# Patient Record
Sex: MALE | Race: WHITE | Employment: STUDENT | URBAN - NONMETROPOLITAN AREA
[De-identification: names, ages, dates, MRNs, and addresses within clinical notes are randomized per-mention and may not be internally consistent; named-entity substitution may affect disease eponyms.]

---
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
## 2025-01-30 ENCOUNTER — HOSPITAL ENCOUNTER (EMERGENCY)
Age: 20
Discharge: HOME | End: 2025-01-30
Attending: FAMILY MEDICINE
Payer: MEDICAID

## 2025-01-30 VITALS
OXYGEN SATURATION: 98 % | SYSTOLIC BLOOD PRESSURE: 123 MMHG | TEMPERATURE: 97.6 F | RESPIRATION RATE: 18 BRPM | DIASTOLIC BLOOD PRESSURE: 61 MMHG | BODY MASS INDEX: 32.2 KG/M2 | HEIGHT: 71 IN | HEART RATE: 93 BPM | WEIGHT: 230 LBS

## 2025-01-30 DIAGNOSIS — J10.1 INFLUENZA A: Primary | ICD-10-CM

## 2025-01-30 LAB
INFLUENZA A BY PCR: DETECTED
INFLUENZA B BY PCR: NOT DETECTED
SARS-COV-2 RNA, RT PCR: NOT DETECTED

## 2025-01-30 PROCEDURE — 99283 EMERGENCY DEPT VISIT LOW MDM: CPT

## 2025-01-30 PROCEDURE — 87636 SARSCOV2 & INF A&B AMP PRB: CPT

## 2025-01-30 RX ORDER — ONDANSETRON 4 MG/1
4 TABLET, FILM COATED ORAL EVERY 8 HOURS PRN
Qty: 20 TABLET | Refills: 0 | Status: SHIPPED | OUTPATIENT
Start: 2025-01-30 | End: 2025-02-19

## 2025-01-30 RX ORDER — LEVOTHYROXINE SODIUM 50 UG/1
50 TABLET ORAL DAILY
COMMUNITY

## 2025-01-30 RX ORDER — DEXTROMETHORPHAN HYDROBROMIDE AND PROMETHAZINE HYDROCHLORIDE 15; 6.25 MG/5ML; MG/5ML
5 SYRUP ORAL 4 TIMES DAILY PRN
Qty: 118 ML | Refills: 0 | Status: SHIPPED | OUTPATIENT
Start: 2025-01-30 | End: 2025-02-06

## 2025-01-30 RX ORDER — TRAZODONE HYDROCHLORIDE 100 MG/1
100 TABLET ORAL NIGHTLY
COMMUNITY

## 2025-01-30 ASSESSMENT — PAIN - FUNCTIONAL ASSESSMENT: PAIN_FUNCTIONAL_ASSESSMENT: 0-10

## 2025-01-30 ASSESSMENT — PAIN SCALES - GENERAL: PAINLEVEL_OUTOF10: 7

## 2025-01-30 NOTE — ED NOTES
Pt presents with c/o cough and fever x 3 days, mother states that pt is on probation and had to come here to get proof of him being sick, pt alert and oriented, able to ambulate to room independently without difficulty.

## 2025-01-30 NOTE — ED PROVIDER NOTES
radiologist.    No orders to display       LABS:   Labs Reviewed   COVID-19 & INFLUENZA COMBO - Abnormal; Notable for the following components:       Result Value    Influenza A by PCR DETECTED (*)     All other components within normal limits   COVID-19 & INFLUENZA COMBO       EMERGENCY DEPARTMENT COURSE & MDM:   Vitals:    Vitals:    01/30/25 0829   BP: 123/61   Pulse: 93   Resp: 18   Temp: 97.6 °F (36.4 °C)   TempSrc: Temporal   SpO2: 98%   Weight: 104.3 kg (230 lb)   Height: 1.803 m (5' 11\")       MDM    9:13 AM EST: The patient was seen and evaluated.  ED Course as of 01/30/25 0915   Thu Jan 30, 2025   0912 We obtained a COVID and influenza screen.  Patient has not tested positive for influenza.  His vital signs are stable.  He is nontoxic-appearing.  I feel he safe for discharge and outpatient management.  He will be treated with symptomatic relief medications [SM]      ED Course User Index  [SM] Ravin Cronin MD         1)  Number and Complexity of Problems  Problem List This Visit:   Chief Complaint   Patient presents with    Cough     X 3 days    Fever     Diagnoses Considered but Do Not Suspect:  Please see Differential Diagnosis  Pertinent Comorbid Conditions:   Please see PMH and ED course     2)  Data Reviewed  EKG was reviewed in detail. See EKG section above.  Decision Rules/Scores utilized:  See ED course.  Tests considered but not ordered and why:  See ED course.   External Documents Reviewed:  Prior medical records  Imaging that is independently reviewed with the radiologist report, not interpreted by me are listed in ED course.   Imaging that is independently reviewed and interpreted by me are listed in ED course.      See more data below for the lab and radiology tests and orders.     Medical Decision Making  Amount and/or Complexity of Data Reviewed  Labs: ordered.  Radiology: ordered.  ECG/medicine tests: ordered.     Risk  Prescription drug management.     3)  Treatment and

## 2025-01-30 NOTE — DISCHARGE INSTRUCTIONS
Ben Smith,    It has been my absolute pleasure to serve you while in the emergency department at [facility] today.  Please do remember to take all medications as prescribed.  Please take cough syrup which also works as a nausea medication called Phenergan.  Will also prescribe tablet for your nausea that you can put on your tongue.    Please make sure you follow-up with your PCP within 7 days.  Please follow-up with any and all specialists as we discussed.  Please return to the ER in case of any worsening of symptoms.  I wish you a speedy recovery!    Sincerely,    Dr. Ravin Cronin MD.

## 2025-01-30 NOTE — DISCHARGE INSTR - COC
Continuity of Care Form    Patient Name: Ben Smith   :  2005  MRN:  [id number]    Admit date:  2025  Discharge date:  ***    Code Status Order: No Order   Advance Directives:   Advance Care Flowsheet Documentation             Admitting Physician:  No admitting provider for patient encounter.  PCP: Avery Holman DO    Discharging Nurse: ***  Discharging Hospital Unit/Room#: E2/E2  Discharging Unit Phone Number: ***    Emergency Contact:   Extended Emergency Contact Information  Primary Emergency Contact: Braydon Smith  Mobile Phone: [phone]  Relation: Parent  Preferred language: English   needed? No    Past Surgical History:  History reviewed. No pertinent surgical history.    Immunization History:     There is no immunization history on file for this patient.    Active Problems:  There is no problem list on file for this patient.      Isolation/Infection:   Isolation            No Isolation          Patient Infection Status       None to display                     Nurse Assessment:  Last Vital Signs: /61   Pulse 93   Temp 97.6 °F (36.4 °C) (Temporal)   Resp 18   Ht 1.803 m (5' 11\")   Wt 104.3 kg (230 lb)   SpO2 98%   BMI 32.08 kg/m²     Last documented pain score (0-10 scale): Pain Level: 7  Last Weight:   Wt Readings from Last 1 Encounters:   25 104.3 kg (230 lb) (98%, Z= 2.08)*     * Growth percentiles are based on [facility] (Boys, 2-20 Years) data.     Mental Status:  {IP PT MENTAL STATUS:}    IV Access:  { YOLANDE IV ACCESS:246832144}    Nursing Mobility/ADLs:  Walking   {CHP DME ADLs:185921568}  Transfer  {CHP DME ADLs:225832541}  Bathing  {CHP DME ADLs:118554538}  Dressing  {CHP DME ADLs:831734266}  Toileting  {CHP DME ADLs:138240156}  Feeding  {CHP DME ADLs:396812210}  Med Admin  {CHP DME ADLs:280921542}  Med Delivery   { YOLANDE MED Delivery:745812981}    Wound Care Documentation and Therapy:        Elimination:  Continence:   Bowel: {YES /

## 2025-05-04 ENCOUNTER — HOSPITAL ENCOUNTER (INPATIENT)
Age: 20
LOS: 4 days | Discharge: HOME | DRG: 751 | End: 2025-05-08
Attending: EMERGENCY MEDICINE | Admitting: PSYCHIATRY & NEUROLOGY
Payer: COMMERCIAL

## 2025-05-04 DIAGNOSIS — F32.A DEPRESSION, UNSPECIFIED DEPRESSION TYPE: Primary | ICD-10-CM

## 2025-05-04 DIAGNOSIS — R45.851 SUICIDAL IDEATION: ICD-10-CM

## 2025-05-04 PROBLEM — F31.30 BIPOLAR AFFECTIVE DISORDER, CURRENT EPISODE DEPRESSED, CURRENT EPISODE SEVERITY UNSPECIFIED (HCC): Status: ACTIVE | Noted: 2025-05-04

## 2025-05-04 LAB
ALBUMIN SERPL BCG-MCNC: 4.6 G/DL (ref 3.4–4.9)
ALP SERPL-CCNC: 60 U/L (ref 40–129)
ALT SERPL W/O P-5'-P-CCNC: 14 U/L (ref 10–50)
AMPHETAMINES UR QL SCN: NEGATIVE
ANION GAP SERPL CALC-SCNC: 13 MEQ/L (ref 8–16)
APAP SERPL-MCNC: < 5 UG/ML (ref 10–30)
AST SERPL-CCNC: 21 U/L (ref 10–50)
BARBITURATES UR QL SCN: NEGATIVE
BASOPHILS ABSOLUTE: 0.1 THOU/MM3 (ref 0–0.1)
BASOPHILS NFR BLD AUTO: 0.8 %
BENZODIAZ UR QL SCN: NEGATIVE
BILIRUB CONJ SERPL-MCNC: < 0.1 MG/DL (ref 0–0.2)
BILIRUB SERPL-MCNC: 0.3 MG/DL (ref 0.3–1.2)
BILIRUB UR QL STRIP.AUTO: NEGATIVE
BUN SERPL-MCNC: 7 MG/DL (ref 8–23)
BZE UR QL SCN: NEGATIVE
CALCIUM SERPL-MCNC: 9.7 MG/DL (ref 8.6–10)
CANNABINOIDS UR QL SCN: POSITIVE
CHARACTER UR: CLEAR
CHLORIDE SERPL-SCNC: 106 MEQ/L (ref 98–111)
CO2 SERPL-SCNC: 25 MEQ/L (ref 22–29)
COLOR, UA: YELLOW
CREAT SERPL-MCNC: 0.9 MG/DL (ref 0.7–1.2)
DEPRECATED RDW RBC AUTO: 39.5 FL (ref 35–45)
EOSINOPHIL NFR BLD AUTO: 1.7 %
EOSINOPHILS ABSOLUTE: 0.1 THOU/MM3 (ref 0–0.4)
ERYTHROCYTE [DISTWIDTH] IN BLOOD BY AUTOMATED COUNT: 12.9 % (ref 11.5–14.5)
ETHANOL SERPL-MCNC: 0.03 % (ref 0–0.08)
FENTANYL: NEGATIVE
GFR SERPL CREATININE-BSD FRML MDRD: > 90 ML/MIN/1.73M2
GLUCOSE SERPL-MCNC: 95 MG/DL (ref 74–109)
GLUCOSE UR QL STRIP.AUTO: NEGATIVE MG/DL
HCT VFR BLD AUTO: 42.3 % (ref 42–52)
HGB BLD-MCNC: 14.6 GM/DL (ref 14–18)
HGB UR QL STRIP.AUTO: NEGATIVE
IMM GRANULOCYTES # BLD AUTO: 0.01 THOU/MM3 (ref 0–0.07)
IMM GRANULOCYTES NFR BLD AUTO: 0.2 %
KETONES UR QL STRIP.AUTO: ABNORMAL
LIPASE SERPL-CCNC: 26 U/L (ref 13–60)
LYMPHOCYTES ABSOLUTE: 1.8 THOU/MM3 (ref 1–4.8)
LYMPHOCYTES NFR BLD AUTO: 28 %
MAGNESIUM SERPL-MCNC: 1.8 MG/DL (ref 1.6–2.6)
MCH RBC QN AUTO: 29.3 PG (ref 26–33)
MCHC RBC AUTO-ENTMCNC: 34.5 GM/DL (ref 32.2–35.5)
MCV RBC AUTO: 84.9 FL (ref 80–94)
MONOCYTES ABSOLUTE: 0.6 THOU/MM3 (ref 0.4–1.3)
MONOCYTES NFR BLD AUTO: 9.7 %
NEUTROPHILS ABSOLUTE: 3.8 THOU/MM3 (ref 1.8–7.7)
NEUTROPHILS NFR BLD AUTO: 59.6 %
NITRITE UR QL STRIP: NEGATIVE
NRBC BLD AUTO-RTO: 0 /100 WBC
OPIATES UR QL SCN: NEGATIVE
OSMOLALITY SERPL CALC.SUM OF ELEC: 284.6 MOSMOL/KG (ref 275–300)
OXYCODONE: NEGATIVE
PCP UR QL SCN: NEGATIVE
PH UR STRIP.AUTO: 7.5 [PH] (ref 5–9)
PLATELET # BLD AUTO: 195 THOU/MM3 (ref 130–400)
PMV BLD AUTO: 11.8 FL (ref 9.4–12.4)
POTASSIUM SERPL-SCNC: 3.6 MEQ/L (ref 3.5–5.2)
PROT SERPL-MCNC: 7 G/DL (ref 6.4–8.3)
PROT UR STRIP.AUTO-MCNC: NEGATIVE MG/DL
RBC # BLD AUTO: 4.98 MILL/MM3 (ref 4.7–6.1)
SALICYLATES SERPL-MCNC: < 0.5 MG/DL (ref 2–10)
SODIUM SERPL-SCNC: 144 MEQ/L (ref 135–145)
SP GR UR REFRACT.AUTO: 1.02 (ref 1–1.03)
T4 FREE SERPL-MCNC: 1.1 NG/DL (ref 0.92–1.68)
TSH SERPL DL<=0.05 MIU/L-ACNC: 1.8 UIU/ML (ref 0.27–4.2)
UROBILINOGEN, URINE: 1 EU/DL (ref 0–1)
WBC # BLD AUTO: 6.3 THOU/MM3 (ref 4.8–10.8)
WBC #/AREA URNS HPF: NEGATIVE /[HPF]

## 2025-05-04 PROCEDURE — 36415 COLL VENOUS BLD VENIPUNCTURE: CPT

## 2025-05-04 PROCEDURE — 80179 DRUG ASSAY SALICYLATE: CPT

## 2025-05-04 PROCEDURE — 82077 ASSAY SPEC XCP UR&BREATH IA: CPT

## 2025-05-04 PROCEDURE — 83690 ASSAY OF LIPASE: CPT

## 2025-05-04 PROCEDURE — 81003 URINALYSIS AUTO W/O SCOPE: CPT

## 2025-05-04 PROCEDURE — 99285 EMERGENCY DEPT VISIT HI MDM: CPT

## 2025-05-04 PROCEDURE — 84443 ASSAY THYROID STIM HORMONE: CPT

## 2025-05-04 PROCEDURE — 85025 COMPLETE CBC W/AUTO DIFF WBC: CPT

## 2025-05-04 PROCEDURE — 80053 COMPREHEN METABOLIC PANEL: CPT

## 2025-05-04 PROCEDURE — 83735 ASSAY OF MAGNESIUM: CPT

## 2025-05-04 PROCEDURE — 82248 BILIRUBIN DIRECT: CPT

## 2025-05-04 PROCEDURE — 1240000000 HC EMOTIONAL WELLNESS R&B

## 2025-05-04 PROCEDURE — 80307 DRUG TEST PRSMV CHEM ANLYZR: CPT

## 2025-05-04 PROCEDURE — 80143 DRUG ASSAY ACETAMINOPHEN: CPT

## 2025-05-04 PROCEDURE — 84439 ASSAY OF FREE THYROXINE: CPT

## 2025-05-04 RX ORDER — POLYETHYLENE GLYCOL 3350 17 G/17G
17 POWDER, FOR SOLUTION ORAL DAILY PRN
Status: DISCONTINUED | OUTPATIENT
Start: 2025-05-04 | End: 2025-05-08 | Stop reason: HOSPADM

## 2025-05-04 RX ORDER — IBUPROFEN 400 MG/1
400 TABLET, FILM COATED ORAL EVERY 6 HOURS PRN
Status: DISCONTINUED | OUTPATIENT
Start: 2025-05-04 | End: 2025-05-08 | Stop reason: HOSPADM

## 2025-05-04 RX ORDER — ACETAMINOPHEN 325 MG/1
650 TABLET ORAL EVERY 4 HOURS PRN
Status: DISCONTINUED | OUTPATIENT
Start: 2025-05-04 | End: 2025-05-08 | Stop reason: HOSPADM

## 2025-05-04 RX ORDER — TRAZODONE HYDROCHLORIDE 50 MG/1
50 TABLET ORAL NIGHTLY PRN
Status: DISCONTINUED | OUTPATIENT
Start: 2025-05-04 | End: 2025-05-05

## 2025-05-04 RX ORDER — HYDROXYZINE HYDROCHLORIDE 25 MG/1
50 TABLET, FILM COATED ORAL 3 TIMES DAILY PRN
Status: DISCONTINUED | OUTPATIENT
Start: 2025-05-04 | End: 2025-05-08 | Stop reason: HOSPADM

## 2025-05-04 ASSESSMENT — PATIENT HEALTH QUESTIONNAIRE - PHQ9
SUM OF ALL RESPONSES TO PHQ QUESTIONS 1-9: 2
2. FEELING DOWN, DEPRESSED OR HOPELESS: SEVERAL DAYS
1. LITTLE INTEREST OR PLEASURE IN DOING THINGS: SEVERAL DAYS
SUM OF ALL RESPONSES TO PHQ QUESTIONS 1-9: 2

## 2025-05-04 ASSESSMENT — SLEEP AND FATIGUE QUESTIONNAIRES
DO YOU HAVE DIFFICULTY SLEEPING: YES
AVERAGE NUMBER OF SLEEP HOURS: 3
SLEEP PATTERN: DIFFICULTY FALLING ASLEEP
DO YOU USE A SLEEP AID: NO

## 2025-05-04 ASSESSMENT — LIFESTYLE VARIABLES
HOW OFTEN DO YOU HAVE A DRINK CONTAINING ALCOHOL: NEVER
HOW MANY STANDARD DRINKS CONTAINING ALCOHOL DO YOU HAVE ON A TYPICAL DAY: PATIENT DOES NOT DRINK
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
## 2025-05-05 PROBLEM — F70 MILD INTELLECTUAL DISABILITIES: Chronic | Status: ACTIVE | Noted: 2025-05-05

## 2025-05-05 PROBLEM — F63.81 INTERMITTENT EXPLOSIVE DISORDER: Chronic | Status: ACTIVE | Noted: 2025-05-05

## 2025-05-05 PROBLEM — F33.1 MDD (MAJOR DEPRESSIVE DISORDER), RECURRENT EPISODE, MODERATE (HCC): Chronic | Status: ACTIVE | Noted: 2025-05-04

## 2025-05-05 LAB
BASOPHILS ABSOLUTE: 0 THOU/MM3 (ref 0–0.1)
BASOPHILS NFR BLD AUTO: 0.6 %
DEPRECATED RDW RBC AUTO: 41.2 FL (ref 35–45)
EOSINOPHIL NFR BLD AUTO: 2.5 %
EOSINOPHILS ABSOLUTE: 0.2 THOU/MM3 (ref 0–0.4)
ERYTHROCYTE [DISTWIDTH] IN BLOOD BY AUTOMATED COUNT: 13.1 % (ref 11.5–14.5)
HCT VFR BLD AUTO: 43.4 % (ref 42–52)
HGB BLD-MCNC: 14.5 GM/DL (ref 14–18)
IMM GRANULOCYTES # BLD AUTO: 0.02 THOU/MM3 (ref 0–0.07)
IMM GRANULOCYTES NFR BLD AUTO: 0.3 %
LYMPHOCYTES ABSOLUTE: 2 THOU/MM3 (ref 1–4.8)
LYMPHOCYTES NFR BLD AUTO: 31.3 %
MCH RBC QN AUTO: 29.1 PG (ref 26–33)
MCHC RBC AUTO-ENTMCNC: 33.4 GM/DL (ref 32.2–35.5)
MCV RBC AUTO: 87 FL (ref 80–94)
MONOCYTES ABSOLUTE: 0.6 THOU/MM3 (ref 0.4–1.3)
MONOCYTES NFR BLD AUTO: 9.5 %
NEUTROPHILS ABSOLUTE: 3.6 THOU/MM3 (ref 1.8–7.7)
NEUTROPHILS NFR BLD AUTO: 55.8 %
NRBC BLD AUTO-RTO: 0 /100 WBC
PLATELET # BLD AUTO: 183 THOU/MM3 (ref 130–400)
PMV BLD AUTO: 11.7 FL (ref 9.4–12.4)
RBC # BLD AUTO: 4.99 MILL/MM3 (ref 4.7–6.1)
WBC # BLD AUTO: 6.5 THOU/MM3 (ref 4.8–10.8)

## 2025-05-05 PROCEDURE — 1240000000 HC EMOTIONAL WELLNESS R&B

## 2025-05-05 PROCEDURE — 6370000000 HC RX 637 (ALT 250 FOR IP): Performed by: PSYCHIATRY & NEUROLOGY

## 2025-05-05 PROCEDURE — 85025 COMPLETE CBC W/AUTO DIFF WBC: CPT

## 2025-05-05 PROCEDURE — 36415 COLL VENOUS BLD VENIPUNCTURE: CPT

## 2025-05-05 RX ORDER — DIVALPROEX SODIUM 500 MG/1
1000 TABLET, FILM COATED, EXTENDED RELEASE ORAL DAILY
Status: DISCONTINUED | OUTPATIENT
Start: 2025-05-05 | End: 2025-05-08 | Stop reason: HOSPADM

## 2025-05-05 RX ORDER — TRAZODONE HYDROCHLORIDE 50 MG/1
50 TABLET ORAL NIGHTLY
Status: DISCONTINUED | OUTPATIENT
Start: 2025-05-05 | End: 2025-05-08

## 2025-05-05 RX ORDER — ARIPIPRAZOLE 10 MG/1
10 TABLET ORAL DAILY
Status: DISCONTINUED | OUTPATIENT
Start: 2025-05-05 | End: 2025-05-05

## 2025-05-05 RX ADMIN — DIVALPROEX SODIUM 1000 MG: 500 TABLET, EXTENDED RELEASE ORAL at 10:35

## 2025-05-05 RX ADMIN — TRAZODONE HYDROCHLORIDE 50 MG: 50 TABLET ORAL at 21:48

## 2025-05-05 RX ADMIN — SERTRALINE 50 MG: 50 TABLET, FILM COATED ORAL at 10:35

## 2025-05-05 ASSESSMENT — SLEEP AND FATIGUE QUESTIONNAIRES
SLEEP PATTERN: DIFFICULTY FALLING ASLEEP;DIFFICULTY ARISING;DISTURBED/INTERRUPTED SLEEP;RESTLESSNESS;NIGHTMARES/TERRORS
AVERAGE NUMBER OF SLEEP HOURS: 3
DO YOU USE A SLEEP AID: NO

## 2025-05-05 ASSESSMENT — PATIENT HEALTH QUESTIONNAIRE - PHQ9
2. FEELING DOWN, DEPRESSED OR HOPELESS: NEARLY EVERY DAY
8. MOVING OR SPEAKING SO SLOWLY THAT OTHER PEOPLE COULD HAVE NOTICED. OR THE OPPOSITE, BEING SO FIGETY OR RESTLESS THAT YOU HAVE BEEN MOVING AROUND A LOT MORE THAN USUAL: NEARLY EVERY DAY
SUM OF ALL RESPONSES TO PHQ QUESTIONS 1-9: 24
SUM OF ALL RESPONSES TO PHQ QUESTIONS 1-9: 27
SUM OF ALL RESPONSES TO PHQ QUESTIONS 1-9: 27
1. LITTLE INTEREST OR PLEASURE IN DOING THINGS: NEARLY EVERY DAY
3. TROUBLE FALLING OR STAYING ASLEEP: NEARLY EVERY DAY
10. IF YOU CHECKED OFF ANY PROBLEMS, HOW DIFFICULT HAVE THESE PROBLEMS MADE IT FOR YOU TO DO YOUR WORK, TAKE CARE OF THINGS AT HOME, OR GET ALONG WITH OTHER PEOPLE: EXTREMELY DIFFICULT
SUM OF ALL RESPONSES TO PHQ QUESTIONS 1-9: 27
5. POOR APPETITE OR OVEREATING: NEARLY EVERY DAY
6. FEELING BAD ABOUT YOURSELF - OR THAT YOU ARE A FAILURE OR HAVE LET YOURSELF OR YOUR FAMILY DOWN: NEARLY EVERY DAY
4. FEELING TIRED OR HAVING LITTLE ENERGY: NEARLY EVERY DAY
7. TROUBLE CONCENTRATING ON THINGS, SUCH AS READING THE NEWSPAPER OR WATCHING TELEVISION: NEARLY EVERY DAY
9. THOUGHTS THAT YOU WOULD BE BETTER OFF DEAD, OR OF HURTING YOURSELF: NEARLY EVERY DAY

## 2025-05-05 ASSESSMENT — LIFESTYLE VARIABLES: HOW OFTEN DO YOU HAVE A DRINK CONTAINING ALCOHOL: NEVER

## 2025-05-05 NOTE — ED NOTES
ED to inpatient nurses report      Chief Complaint:  Chief Complaint   Patient presents with    Psychiatric Evaluation     Present to ED from: home    MOA:     LOC: alert and orientated to name, place, date  Mobility: Independent  Oxygen Baseline: none    Current needs required: none     Code Status:   No Order    What abnormal results were found and what did you give/do to treat them? See labs, imaging.   Any procedures or intervention occur? See MAR.     Mental Status:  Level of Consciousness: Alert (0)    Psych Assessment:        Vitals:  Patient Vitals for the past 24 hrs:   BP Temp Temp src Pulse Resp SpO2 Height Weight   05/04/25 1949 (!) 146/84 98 °F (36.7 °C) Oral (!) 108 18 96 % 1.829 m (6') 96.6 kg (213 lb)        LDAs:      Ambulatory Status:  Presents to emergency department  because of falls (Syncope, seizure, or loss of consciousness): No, Age > 70: No, Altered Mental Status, Intoxication with alcohol or substance confusion (Disorientation, impaired judgment, poor safety awaremess, or inability to follow instructions): No, Impaired Mobility: Ambulates or transfers with assistive devices or assistance; Unable to ambulate or transer.: No, Nursing Judgement: No    Diagnosis:  DISPOSITION Decision To Admit 05/04/2025 10:25:48 PM   Final diagnoses:   Depression, unspecified depression type   Suicidal ideation        Consults:  None     Pain Score:       C-SSRS:   Risk of Suicide: No Risk    Sepsis Screening:       Lexi Fall Risk:  [place] 1 Fall Risk  Presents to emergency department  because of falls (Syncope, seizure, or loss of consciousness): No  Age > 70: No  Altered Mental Status, Intoxication with alcohol or substance confusion (Disorientation, impaired judgment, poor safety awaremess, or inability to follow instructions): No  Impaired Mobility: Ambulates or transfers with assistive devices or assistance; Unable to ambulate or transer.: No  Nursing Judgement: No    Swallow Screening        Preferred

## 2025-05-05 NOTE — PROGRESS NOTES
Behavioral Health   Admission Note   Admission Type: Involuntary    Reason for Admission: Suicidal attempts    Patient Strengths/Barriers  Strengths (Must Choose Two): Other (comment) (\"i dont knowe \")  Barriers: Support of organized community    Addictive Behavior  In the Past 3 Months, Have You Felt or Has Someone Told You That You Have a Problem With  : None    Medical Problems:   History reviewed. No pertinent past medical history.    Status EXAM:  Mental Status and Behavioral Exam  Normal: No  Level of Assistance: Independent/Self  Facial Expression: Expressionless  Affect: Appropriate  Level of Consciousness: Alert  Frequency of Checks: 4 times per hour, close  Mood:Normal: No  Mood: Suspicious, Anxious, Depressed, Angry, Empty, Irritable  Motor Activity:Normal: Yes  Eye Contact: Good  Observed Behavior: Cooperative  Sexual Misconduct History: Current - no  Preception: [place] to person, [place] to time, [place] to place  Attention:Normal: Yes  Thought Processes: Unremarkable  Thought Content:Normal: No  Thought Content: Phobias, Poverty of content, Compulsions  Depression Symptoms: Feelings of helplessness, Feelings of worthlessness, Isolative, Loss of interest  Anxiety Symptoms: Generalized  Marsha Symptoms: Flight of ideas, Poor judgment  Hallucinations: Auditory (comment) (Hearing my name.)  Delusions: No  Memory:Normal: Yes  Insight and Judgment: Yes  Insight and Judgment: Poor insight, Unrealistic    Pt admitted with followings belongings:  Dental Appliances: None  Vision - Corrective Lenses: None  Hearing Aid: None  Jewelry: None  Body Piercings Removed: N/A  Clothing: Footwear, Pants, Shirt, Socks  Other Valuables: Other (Comment) (n/a)     Admission order obtained Yes  Belongings sent home with n/a .   Valuables placed in safe in security envelope, number:  n/a . Patient's home medications were n/a .    Patient oriented to surroundings and program expectations and copy of patient rights given.   Received

## 2025-05-05 NOTE — PROGRESS NOTES
Psychosocial Assessment    Current Level of Psychosocial Functioning     Independent XXX  Dependent    Minimal Assist     Comments:      Psychosocial High Risk Factors (check all that apply)    Unable to obtain meds   Chronic illness/pain    Substance abuse   Lack of Family Support XXX  Financial stress   Isolation   Inadequate Community Resources  Suicide attempt(s) XXX  Not taking medications  XXX  Victim of crime   Developmental Delay  Unable to manage personal needs    Age 65 or older   Homeless  No transportation   Readmission within 30 days  Unemployment XXX  Traumatic Event    Family/Supports identified: \"I don't have one\"    Sexual Orientation:  Heterosexual    Patient Personal Strengths: funny    Patient Protective Factors:knowledgeable of area resources    Patient Risk Factors: does not take medications are prescribed, reported that he has no support system, difficult relationship with step dad, unemployed, relies on mother for transportation    Safety plan:  Discussed the safety planning process with the patient.    CMHC/MH history: Patient denies history of inpatient admission. Patient endorses history of suicidal ideations and a suicide attempt when he was 18 years old. Patient reports that following the attempt he was not admitted to a mental health facility.     Plan of Care:  medication management, group/individual therapies, family meetings, psycho -education, treatment team meetings to assist with stabilization    Initial Discharge Plan:  Patient to be discharge to his mother's residence. Patient will follow up with [facility].     Clinical Summary:  Ben Smith is a 19 year old male who was involuntarily admitted to the unit from the ED due to suicidal ideations. Patient reports that he \"doesn't really know\" why he is on the unit. Patient reports that he was \"flipping out, the  got called, and he was brought to the hospital.\"

## 2025-05-05 NOTE — BH NOTE
INPATIENT RECREATIONAL THERAPY  ADULT BEHAVIORAL SERVICES  EVALUATION    REFERRING PHYSICIAN:  Stanton Villalobos MD  DIAGNOSIS: Bipolar affective disorder, current episode depressed, current episode severity unspecified     PRECAUTIONS:  standard   HISTORY OF PRESENT ILLNESS/INJURY:   19-year-old single  male was brought to [facility] ED under an E[facility] from [facility]. He had an argument at home and was holding a knife, threatening to kill himself. He had a slight cut in his neck.   PMH:  Please see medical chart for prior medical history, allergies, and medicatio    HISTORY OF PSYCHIATRIC TREATMENT:  This is his first psychiatric admission   YOB: 2005  GENDER:  male  MARITAL STATUS: single   EMPLOYMENT STATUS:  on disability receives SSI   LIVING SITUATION/SUPPORT:  Lives at home with mother/stepfather/ and 2 sibling   EDUCATIONAL LEVEL: graduated from high school   MEDICATION/DRUG USE:He has a history of cannabis from 7 years old until last year, although his tox screen is positive for cannabis. He denies other illicit drugs.     LEISURE INTERESTS:  playing videogames/listening to music   ACTIVITY PREFERENCE: no preference   ACTIVITY TYPES:  indoor/outdoor/active/passive   COGNITION: A&xO4    COPING: poor  ATTENTION: fair  RELAXATION: poor  SELF-ESTEEM: poor  MOTIVATION:  poor    SOCIAL SKILLS:  fair  FRUSTRATION TOLERANCE:  no documented hx of violent or aggressive behavior   ATTENTION SEEKING: no attention seeking behaviors observed at this time  COOPERATION: pt. Was pleasant and completed assessment  AFFECT: blunt-flat  APPEARANCE: pt. Is currently wearing hospital attire.     HEARING:  WNL  VISION:  WNL   VERBAL COMMUNICATION:  no Impairment noted at this time  WRITTEN COMMUNICATION:  no impairment noted at this time    COORDINATION: no impairment noted at this time   MOBILITY: pt. Is able to ambulate independently on the unit     GOALS: .to increase socialization and

## 2025-05-05 NOTE — PATIENT CARE CONFERENCE
Behavioral Health  Initial Interdisciplinary Treatment Plan NOTE    REVIEW DATE AND TIME: 5/5/2025 1202    PATIENT was IN TREATMENT TEAM.  See Multidisciplinary Treatment Team sheet for participants.    ADMISSION TYPE:   Admission Type: Involuntary    REASON FOR ADMISSION:  Reason for Admission: Suicidal attempts      Estimated Length of Stay Update:  3 to 5 days  Estimated Discharge Date Update: 5/6/2025    Patient Strengths/Barriers  Strengths (Must Choose Two): Other (comment) (\"i dont knowe \")  Barriers: Support of organized community  Addictive Behavior:Addictive Behavior  In the Past 3 Months, Have You Felt or Has Someone Told You That You Have a Problem With  : None  Medical Problems:History reviewed. No pertinent past medical history.    EDUCATION:   Learner Progress Toward Treatment Goals: Reviewed results and recommendations of this team, Reviewed group plan and strategies, Reviewed signs, symptoms and risk of self harm and violent behavior, and Reviewed goals and plan of care    Method: Small group    Outcome: Verbalized understanding and Demonstrated Understanding    PATIENT GOALS: \"learn how to talk about my emotions\"    OQ PRIORITIES IDENTIFIED BY THE PATIENT ON ADMISSION: (These are the symptoms that the patient has identified that are impacting them the most at the time of admission based on their own input on the OQ.  These priorities are to be included in all of their care while admitted.)        47-No Priorities    PLAN/TREATMENT RECOMMENDATIONS UPDATE:   What is the most important thing we can help you with while you are here? See Above  Who is your support system? \"I don't have one\"  Do you have follow-up providers? [facility]  Do you have the ability to pay for your medications? Yes [place] Medicaid  Where will you be residing when you leave the hospital? Patient resides with his mother, step dad, and brothers  Will need a return to work slip or FMLA paper completion? denies      GOALS UPDATE:

## 2025-05-05 NOTE — PLAN OF CARE
Problem: Anxiety  Goal: Will report anxiety at manageable levels  Description: INTERVENTIONS:1. Administer medication as ordered2. Teach and rehearse alternative coping skills3. Provide emotional support with 1:1 interaction with staff  5/5/2025 0852 by Christie Krishnan, RN  Outcome: Not Progressing  Flowsheets (Taken 5/5/2025 0852)  Will report anxiety at manageable levels:   Teach and rehearse alternative coping skills   Provide emotional support with 1:1 interaction with staff  Note: Continues to have anxiety.     Problem: Coping  Goal: Pt/Family able to verbalize concerns and demonstrate effective coping strategies  Description: INTERVENTIONS:1. Assist patient/family to identify coping skills, available support systems and cultural and spiritual values2. Provide emotional support, including active listening and acknowledgement of concerns of patient and caregivers3. Reduce environmental stimuli, as able4. Instruct patient/family in relaxation techniques, as appropriate5. Assess for spiritual pain/suffering and initiate Spiritual Care, Psychosocial Clinical Specialist consults as needed  5/5/2025 1401 by Tom Valentine, CTRS  Outcome: Progressing  Flowsheets (Taken 5/5/2025 1401)  Patient/family able to verbalize anxieties, fears, and concerns, and demonstrate effective coping:   Assist patient/family to identify coping skills, available support systems and cultural and spiritual values   Provide emotional support, including active listening and acknowledgement of concerns of patient and caregivers  Note: Pt has attended 2/3 group therapy sessions offered thus far today on the unit. Pt is engaging in group therapy conversation independently and socializes appropriately with his peers. Pt has been seen out on the unit interacting with others and participating in independent recreation resources out on the unit appropriately. Plan to continue to monitor progress and encourage group therapy participation and

## 2025-05-05 NOTE — PROGRESS NOTES
This clinician was walking by the patient when she overheard him tell another patient that he was not staying on the unit. A fellow patient told this patient that saying stuff like that will make him stay longer. Patient responded, \"I swear on my dead brother's grave they are not keeping me here. If they do then things are going to get ugly\". Patient walked away from this clinician at this time and this clinician could not hear the conversation further.

## 2025-05-05 NOTE — BH NOTE
Patient states he is unable to take abilify which has been verified with [facility], [place]. States it \"makes me gain weight, makes me fat\".

## 2025-05-05 NOTE — PLAN OF CARE
Problem: Behavior  Goal: Pt/Family maintain appropriate behavior and adhere to behavioral management agreement, if implemented  Description: INTERVENTIONS:1. Assess patient/family's coping skills and  non-compliant behavior (including use of illegal substances)2. Notify security of behavior or suspected illegal substances which indicate the need for search of the family and/or belongings3. Encourage verbalization of thoughts and concerns in a socially appropriate manner4. Utilize positive, consistent limit setting strategies supporting safety of patient, staff and others5. Encourage participation in the decision making process about the behavioral management agreement6. If a visitor's behavior poses a threat to safety call refer to organization policy.7. Initiate consult with , Psychosocial CNS, Spiritual Care as appropriate  Outcome: Progressing  Flowsheets (Taken 5/5/2025 0275)  Patient/family maintains appropriate behavior and adheres to behavioral management agreement, if implemented:   Utilize positive, consistent limit setting strategies supporting safety of patient, staff and others   Encourage verbalization of thoughts and concerns in a socially appropriate manner   Encourage participation in the decision making process about the behavioral management agreement  Note: Cooperative.      Problem: Depression/Self Harm  Goal: Effect of psychiatric condition will be minimized and patient will be protected from self harm  Description: INTERVENTIONS:1. Assess impact of patient's symptoms on level of functioning, self care needs and offer support as indicated2. Assess patient/family knowledge of depression, impact on illness and need for teaching3. Provide emotional support, presence and reassurance4. Assess for possible suicidal thoughts or ideation. If patient expresses suicidal thoughts or statements do not leave alone, initiate Suicide Precautions, move to a room close to the nursing station and

## 2025-05-05 NOTE — PROGRESS NOTES
Discharge Planning-Ben is to go to [facility] for a counseling session with Sarina on Monday May 19, 2025 at 8:00 am.

## 2025-05-05 NOTE — PROGRESS NOTES
Group Therapy Note    Date: 5/5/2025  Start Time: 0930  End Time:  1030  Number of Participants: 5    Type of Group: Psychotherapy    Notes:  Patient was present during group. Members discussed boundaries. Members reflected on healthy and unhealthy boundaries. Members discussed internal boundaries. Members discussed self reflection and how it can feel uncomfortable to reflect on yourself. Members discussed ways to communicate boundaries in a healthy manner.     Status After Intervention:  Improved    Participation Level: Active Listener and Interactive    Participation Quality: Appropriate, Attentive, Sharing, and Supportive      Speech:  normal      Thought Process/Content: Logical  Linear      Affective Functioning: Congruent      Mood: euthymic      Level of consciousness:  Alert, Oriented x4, and Attentive      Response to Learning: Able to verbalize current knowledge/experience, Able to verbalize/acknowledge new learning, Able to retain information, Capable of insight, Able to change behavior, and Progressing to goal      Endings: None Reported    Modes of Intervention: Education, Support, Socialization, Exploration, Clarifying, and Problem-solving      Discipline Responsible: /Counselor      Signature:  AUBRIE Villatoro

## 2025-05-05 NOTE — GROUP NOTE
Group Therapy Note    Date: 5/5/2025    Group Start Time: 1100  Group End Time: 1145  Group Topic: Recreational    [facility] Adult Psych 7E    Tom Valentine CTRS        Group Therapy Note    Attendees: 5       Patient's Goal:  To promote social interaction and skill development through music activity.      Notes: Pt. Was an active participant in the music activity that was provided from volunteers. Pt. Participated in music activity by playing the percussive instruments provided. Pt was an active participant in group conversation when discussing the music and the instruments with verbal cues and prompting from writer. Without verbal prompt, patient is an active listener    Status After Intervention:  Improved    Participation Level: Active Listener and Interactive    Participation Quality: Appropriate, Attentive, Sharing, and Supportive      Speech:  normal      Thought Process/Content: Logical  Linear      Affective Functioning: Congruent      Mood: euthymic      Level of consciousness:  Alert, Oriented x4, and Attentive      Response to Learning: Able to verbalize current knowledge/experience, Able to verbalize/acknowledge new learning, Able to retain information, Capable of insight, Able to change behavior, and Progressing to goal      Endings: None Reported    Modes of Intervention: Education, Support, Socialization, Exploration, Clarifying, Problem-solving, and Activity      Discipline Responsible: Recreational Therapist      Signature:  OLIVE Leary

## 2025-05-06 PROCEDURE — 6370000000 HC RX 637 (ALT 250 FOR IP): Performed by: PSYCHIATRY & NEUROLOGY

## 2025-05-06 PROCEDURE — 1240000000 HC EMOTIONAL WELLNESS R&B

## 2025-05-06 RX ADMIN — DIVALPROEX SODIUM 1000 MG: 500 TABLET, EXTENDED RELEASE ORAL at 07:55

## 2025-05-06 RX ADMIN — TRAZODONE HYDROCHLORIDE 50 MG: 50 TABLET ORAL at 21:03

## 2025-05-06 RX ADMIN — HYDROXYZINE HYDROCHLORIDE 50 MG: 25 TABLET, FILM COATED ORAL at 07:58

## 2025-05-06 RX ADMIN — SERTRALINE 50 MG: 50 TABLET, FILM COATED ORAL at 07:56

## 2025-05-06 RX ADMIN — IBUPROFEN 400 MG: 400 TABLET, FILM COATED ORAL at 07:59

## 2025-05-06 ASSESSMENT — PAIN DESCRIPTION - ORIENTATION: ORIENTATION: RIGHT;LEFT

## 2025-05-06 ASSESSMENT — LIFESTYLE VARIABLES: HOW OFTEN DO YOU HAVE A DRINK CONTAINING ALCOHOL: NEVER

## 2025-05-06 ASSESSMENT — PAIN SCALES - GENERAL
PAINLEVEL_OUTOF10: 7
PAINLEVEL_OUTOF10: 7
PAINLEVEL_OUTOF10: 4

## 2025-05-06 ASSESSMENT — PAIN - FUNCTIONAL ASSESSMENT
PAIN_FUNCTIONAL_ASSESSMENT: ACTIVITIES ARE NOT PREVENTED
PAIN_FUNCTIONAL_ASSESSMENT: ACTIVITIES ARE NOT PREVENTED

## 2025-05-06 ASSESSMENT — PAIN DESCRIPTION - DESCRIPTORS: DESCRIPTORS: DISCOMFORT

## 2025-05-06 ASSESSMENT — PAIN DESCRIPTION - LOCATION
LOCATION: RIB CAGE
LOCATION: RIB CAGE

## 2025-05-06 ASSESSMENT — PAIN DESCRIPTION - FREQUENCY: FREQUENCY: INTERMITTENT

## 2025-05-06 NOTE — PLAN OF CARE
Problem: Coping  Goal: Pt/Family able to verbalize concerns and demonstrate effective coping strategies  Description: INTERVENTIONS:1. Assist patient/family to identify coping skills, available support systems and cultural and spiritual values2. Provide emotional support, including active listening and acknowledgement of concerns of patient and caregivers3. Reduce environmental stimuli, as able4. Instruct patient/family in relaxation techniques, as appropriate5. Assess for spiritual pain/suffering and initiate Spiritual Care, Psychosocial Clinical Specialist consults as needed  5/6/2025 1410 by Tom Valentine CTRS  Outcome: Progressing  Flowsheets (Taken 5/6/2025 1410)  Patient/family able to verbalize anxieties, fears, and concerns, and demonstrate effective coping:   Assist patient/family to identify coping skills, available support systems and cultural and spiritual values   Provide emotional support, including active listening and acknowledgement of concerns of patient and caregivers  Note: Pt has attended 4/4 group therapy sessions offered thus far today on the unit. Pt is engaging in group therapy conversation independently and socializes appropriately with his peers. Pt has been seen out on the unit interacting with others and participating in independent recreation resources out on the unit appropriately. Plan to continue to monitor progress and encourage group therapy participation and socialization on the unit.

## 2025-05-06 NOTE — PATIENT CARE CONFERENCE
Behavioral Health   Day 3 Interdisciplinary Treatment Plan NOTE    Review Date & Time: 5/06/25 1106    Patient was in treatment team    Admission Type:   Admission Type: Involuntary    Reason for admission:  Reason for Admission: Suicidal attempts  Estimated Length of Stay Update:  1-3 days  Estimated Discharge Date Update: 5/09/25    Patient Strengths/Barriers  Strengths (Must Choose Two): Other (comment) (\"i dont knowe \")  Barriers: Support of organized community  Addictive Behavior:Addictive Behavior  In the Past 3 Months, Have You Felt or Has Someone Told You That You Have a Problem With  : None  Medical Problems:History reviewed. No pertinent past medical history.    Risk:  Fall Risk   Darren Scale Darren Scale Score: 21    Status EXAM:   Mental Status and Behavioral Exam  Normal: No  Level of Assistance: Independent/Self  Facial Expression: Flat  Affect: Blunt  Level of Consciousness: Alert  Frequency of Checks: 4 times per hour, close  Mood:Normal: No  Mood: Anxious, Sad  Motor Activity:Normal: Yes  Eye Contact: Fair  Observed Behavior: Cooperative  Sexual Misconduct History: Current - no  Preception: Elmore to person, Elmore to time, [place] to place, [place] to situation  Attention:Normal: Yes  Thought Processes: Unremarkable  Thought Content:Normal: No  Thought Content: Phobias  Depression Symptoms: Change in energy level  Anxiety Symptoms: Generalized  Marsha Symptoms: No problems reported or observed.  Hallucinations: None  Delusions: No  Memory:Normal: Yes  Insight and Judgment: No  Insight and Judgment: Poor judgment, Poor insight    Daily Assessment Last Entry:   Daily Sleep (WDL): Within Defined Limits            Daily Nutrition (WDL): Within Defined Limits  Level of Assistance: Independent/Self    Patient Monitoring:  Frequency of Checks: 4 times per hour, close    Psychiatric Symptoms:   Depression Symptoms  Depression Symptoms: Change in energy level  Anxiety Symptoms  Anxiety Symptoms:

## 2025-05-06 NOTE — PROGRESS NOTES
Spiritual Support Group Note    Number of Participants in Group: 2 PT 6 Students                                  Goal:   The spirituality group focused on utilizing a day of rest in one's weekly mental health routine. This lesson focused on sabbath as a day of reflection, journaling, community, and meditation. Sabbath as a day was also approached for the purpose of teaching boundaries in relation to time. Sabbath is also a boundary on unhealthy habits that inhibit mental health. The goal in the group is to establish a day every week to sabbath. This intentional creation of a day of reflection assists with creating a space to evaluate one's development and mental health care progress by using rest. Sabbathing was encouraged as a way to see ones stay in the hospital as well. The stay in behavioral health is a form of forced sabbath.    Topic:  [x] Spiritual Wellness and Self Care                  [] Hope                     [x] Connecting with Divine/Others        [] Thankfulness and Gratitude               []  Meaningfulness and Purpose               [] Forgiveness               [] Peace               [x] Connect to Community     [] Other:    Participation Level:   [x] Active Listener   [] Minimal   [] Monopolizing   [] Interactive   [] No Participation   []  Other:     Attention:   [x] Alert   [] Distractible   [] Drowsy   [] Poor   [] Other:    Manner:   [x] Cooperative   [] Suspicious   [] Withdrawn   [] Guarded   [] Irritable   [] Inhospitable   [] Other:

## 2025-05-06 NOTE — PROGRESS NOTES
Patient participated in nursing group about positive affirmations at this time. Participated appropriately.

## 2025-05-06 NOTE — PROGRESS NOTES
Daily Progress Note  Stanton Villalobos MD  5/6/2025    Reviewed patient's current plan of care and vital signs with nursing staff.  Sleep:  7 hours last night  Attending groups: Yes  No reported Suicidal thought. Fair interaction with peers & staff. No agitation. Mood 7 on a scale of 1 to 10 with 10 is feeling normal. He is hopeful.      SUBJECTIVE:    Patient is feeling better. SUICIDAL IDEATION denies suicidal ideation.  Patient does not have medication side effects.    ROS: Patient has new complaints:  No  Sleeping adequately:  Yes  Visitors: No    Mental Status Examination:  Patient is cooperative. Speech: Normal rate and tone.  No abnormal movements, tics or mannerisms.  Mood dysthymic; affect normal affect. Suicidal ideation Absent.  Homicidal ideations Absent.   Hallucinations Absent.  Delusions Absent. Thought Content: normal. Thought Processes: Goal-Directed. Alert and oriented X 3.  Attention and concentration fair. MEMORY intact.  Insight and Judgement fair.      Data   height is 1.829 m (6') and weight is 96.6 kg (213 lb). His tympanic temperature is 97.3 °F (36.3 °C). His blood pressure is 131/83 and his pulse is 81. His respiration is 16 and oxygen saturation is 100%.   Labs:   Admission on 05/04/2025   Component Date Value Ref Range Status    WBC 05/04/2025 6.3  4.8 - 10.8 thou/mm3 Final    RBC 05/04/2025 4.98  4.70 - 6.10 mill/mm3 Final    Hemoglobin 05/04/2025 14.6  14.0 - 18.0 gm/dl Final    Hematocrit 05/04/2025 42.3  42.0 - 52.0 % Final    MCV 05/04/2025 84.9  80.0 - 94.0 fL Final    MCH 05/04/2025 29.3  26.0 - 33.0 pg Final    MCHC 05/04/2025 34.5  32.2 - 35.5 gm/dl Final    RDW-CV 05/04/2025 12.9  11.5 - 14.5 % Final    RDW-SD 05/04/2025 39.5  35.0 - 45.0 fL Final    Platelets 05/04/2025 195  130 - 400 thou/mm3 Final    MPV 05/04/2025 11.8  9.4 - 12.4 fL Final    Seg Neutrophils 05/04/2025 59.6  % Final    Lymphocytes 05/04/2025 28.0  % Final    Monocytes % 05/04/2025 9.7  % Final    Eosinophils  05/05/2025 29.1  26.0 - 33.0 pg Final    MCHC 05/05/2025 33.4  32.2 - 35.5 gm/dl Final    RDW-CV 05/05/2025 13.1  11.5 - 14.5 % Final    RDW-SD 05/05/2025 41.2  35.0 - 45.0 fL Final    Platelets 05/05/2025 183  130 - 400 thou/mm3 Final    MPV 05/05/2025 11.7  9.4 - 12.4 fL Final    Seg Neutrophils 05/05/2025 55.8  % Final    Lymphocytes 05/05/2025 31.3  % Final    Monocytes % 05/05/2025 9.5  % Final    Eosinophils 05/05/2025 2.5  % Final    Basophils 05/05/2025 0.6  % Final    Immature Granulocytes % 05/05/2025 0.3  % Final    Neutrophils Absolute 05/05/2025 3.6  1.8 - 7.7 thou/mm3 Final    Lymphocytes Absolute 05/05/2025 2.0  1.0 - 4.8 thou/mm3 Final    Monocytes Absolute 05/05/2025 0.6  0.4 - 1.3 thou/mm3 Final    Eosinophils Absolute 05/05/2025 0.2  0.0 - 0.4 thou/mm3 Final    Basophils Absolute 05/05/2025 0.0  0.0 - 0.1 thou/mm3 Final    Immature Grans (Abs) 05/05/2025 0.02  0.00 - 0.07 thou/mm3 Final    nRBC 05/05/2025 0  /100 wbc Final    Performed at [facility] [address] [place] [address]    T4 Free 05/04/2025 1.10  0.92 - 1.68 ng/dL [place] [address]            Medications  Current Facility-Administered Medications: divalproex (DEPAKOTE ER) extended release tablet 1,000 mg, 1,000 mg, Oral, Daily  sertraline (ZOLOFT) tablet 50 mg, 50 mg, Oral, Daily  traZODone (DESYREL) tablet 50 mg, 50 mg, Oral, Nightly  acetaminophen (TYLENOL) tablet 650 mg, 650 mg, Oral, Q4H PRN  ibuprofen (ADVIL;MOTRIN) tablet 400 mg, 400 mg, Oral, Q6H PRN  polyethylene glycol (GLYCOLAX) packet 17 g, 17 g, Oral, Daily PRN  hydrOXYzine HCl (ATARAX) tablet 50 mg, 50 mg, Oral, TID PRN    ASSESSMENT  MDD (major depressive disorder), recurrent episode, moderate (HCC)     PLAN  Patient's symptoms are improving  Continue with current medications.  Attempt to develop insight  Psycho-education conducted.  Supportive Therapy conducted.

## 2025-05-06 NOTE — PLAN OF CARE
Problem: Discharge Planning  Goal: Discharge to home or other facility with appropriate resources  5/5/2025 2212 by Avis Gay RN  Outcome: Not Progressing  Flowsheets (Taken 5/5/2025 2101)  Discharge to home or other facility with appropriate resources: Identify barriers to discharge with patient and caregiver  Note: No discharge plans in place.   5/5/2025 0852 by Christie Krishnan RN  Outcome: Progressing  Flowsheets (Taken 5/5/2025 0852)  Discharge to home or other facility with appropriate resources: Identify barriers to discharge with patient and caregiver  Note: Follow up TBD, discharge planning in progress with staff and patient for safe discharge planning.     Problem: Anxiety  Goal: Will report anxiety at manageable levels  Description: INTERVENTIONS:1. Administer medication as ordered2. Teach and rehearse alternative coping skills3. Provide emotional support with 1:1 interaction with staff  5/5/2025 2212 by Avis Gay RN  Outcome: Progressing  Flowsheets  Taken 5/5/2025 2112  Will report anxiety at manageable levels:   Administer medication as ordered   Teach and rehearse alternative coping skills   Provide emotional support with 1:1 interaction with staff  Taken 5/5/2025 2101  Will report anxiety at manageable levels:   Administer medication as ordered   Teach and rehearse alternative coping skills   Provide emotional support with 1:1 interaction with staff  Note: Patient continues to report elevated anxiety, but states that it has improved today.  5/5/2025 0852 by Christie Krishnan RN  Outcome: Not Progressing  Flowsheets (Taken 5/5/2025 0852)  Will report anxiety at manageable levels:   Teach and rehearse alternative coping skills   Provide emotional support with 1:1 interaction with staff  Note: Continues to have anxiety.     Problem: Coping  Goal: Pt/Family able to verbalize concerns and demonstrate effective coping strategies  Description: INTERVENTIONS:1. Assist patient/family to identify
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
## 2025-05-06 NOTE — GROUP NOTE
Group Therapy Note    Date: 5/6/2025    Group Start Time: 0900  Group End Time: 0930  Group Topic: Community Meeting    [facility]    Tom Valentine CTRS        Group Therapy Note    Attendees: 3       Patient's Goal:  Learn how to talk with people about my feelings     Notes:  Pt is an active participant in group conversation with verbal cues and prompting from writer. Without verbal prompt, patient is an active listener.  Group was educated on SMART goals and the policy and procedure of the unit. Pt. Was receptive to the education and was able to identify a daily goal.. Pt. Will continue to be encouraged to participate in planned TR sessions.       Status After Intervention:  Improved    Participation Level: Active Listener and Interactive    Participation Quality: Appropriate, Attentive, Sharing, and Supportive      Speech:  normal      Thought Process/Content: Logical  Linear      Affective Functioning: Blunted and Flat      Mood: euthymic      Level of consciousness:  Alert, Oriented x4, and Attentive      Response to Learning: Able to verbalize current knowledge/experience, Able to verbalize/acknowledge new learning, Able to retain information, Capable of insight, Able to change behavior, and Progressing to goal      Endings: None Reported    Modes of Intervention: Education, Support, Socialization, Exploration, Clarifying, Problem-solving, and Activity      Discipline Responsible: Recreational Therapist      Signature:  OLIVE Leary

## 2025-05-07 PROCEDURE — 1240000000 HC EMOTIONAL WELLNESS R&B

## 2025-05-07 PROCEDURE — 6370000000 HC RX 637 (ALT 250 FOR IP): Performed by: PSYCHIATRY & NEUROLOGY

## 2025-05-07 RX ADMIN — TRAZODONE HYDROCHLORIDE 50 MG: 50 TABLET ORAL at 20:51

## 2025-05-07 RX ADMIN — DIVALPROEX SODIUM 1000 MG: 500 TABLET, EXTENDED RELEASE ORAL at 07:50

## 2025-05-07 RX ADMIN — SERTRALINE 50 MG: 50 TABLET, FILM COATED ORAL at 07:50

## 2025-05-07 RX ADMIN — HYDROXYZINE HYDROCHLORIDE 50 MG: 25 TABLET, FILM COATED ORAL at 07:50

## 2025-05-07 ASSESSMENT — PAIN SCALES - GENERAL
PAINLEVEL_OUTOF10: 0
PAINLEVEL_OUTOF10: 0

## 2025-05-07 NOTE — DISCHARGE INSTRUCTIONS
[facility] Hotline:  [phone]    Crisis phone numbers:  formerly [facility], and [facility] [phone].  [facility], and [facility] [phone]  [facility] [phone].  [facility] [phone].  [facility] [phone].  [facility] [place] [phone].    [facility]  [address], [place] [address]  [phone]    [facility]  [address] [facility]  [address] [place], [place] [address]  [place], [place] [id number] [phone]    [facility]  [address] [place] [address]  [phone]    [facility]  Rec[facility]  [address] [place] [address]  [phone]    [facility]  [address] [place] [address]  [phone]    [facility]  [address], [place] [id number] [phone]    [facility]  [address] [place] [address]  [phone]    [facility]  Recovery and Wellness Center  [address], [place] [id number] [phone]    [facility]  [address] [address]  [phone]    [address] [address]  [phone]    [facility]  [address] [place], [place] [address] [phone]    [facility]  [address] [place] [address]  [phone]    [facility]  [address] [place], [place] [address] [phone]

## 2025-05-07 NOTE — PLAN OF CARE
Problem: Anxiety  Goal: Will report anxiety at manageable levels  Description: INTERVENTIONS:1. Administer medication as ordered2. Teach and rehearse alternative coping skills3. Provide emotional support with 1:1 interaction with staff  5/7/2025 0915 by Lisset Barboza RN  Outcome: Not Progressing  Flowsheets (Taken 5/7/2025 0904)  Will report anxiety at manageable levels:   Administer medication as ordered   Teach and rehearse alternative coping skills   Provide emotional support with 1:1 interaction with staff  Note: Patient reports  anxiety. Pt taking Atarax prn. Verbalized medication was somewhat effective.  5/6/2025 2108 by Avis Gay, RN  Outcome: Progressing  Flowsheets  Taken 5/6/2025 2030  Will report anxiety at manageable levels:   Administer medication as ordered   Teach and rehearse alternative coping skills   Provide emotional support with 1:1 interaction with staff  Taken 5/6/2025 2023  Will report anxiety at manageable levels:   Administer medication as ordered   Teach and rehearse alternative coping skills   Provide emotional support with 1:1 interaction with staff  Note: Patient continues to report anxiety, but states that it has improved since his admission.      Problem: Coping  Goal: Pt/Family able to verbalize concerns and demonstrate effective coping strategies  Description: INTERVENTIONS:1. Assist patient/family to identify coping skills, available support systems and cultural and spiritual values2. Provide emotional support, including active listening and acknowledgement of concerns of patient and caregivers3. Reduce environmental stimuli, as able4. Instruct patient/family in relaxation techniques, as appropriate5. Assess for spiritual pain/suffering and initiate Spiritual Care, Psychosocial Clinical Specialist consults as needed  5/7/2025 0915 by Lisset Barboza RN  Outcome: Progressing  Flowsheets (Taken 5/6/2025 2030 by Avis Gay, RN)  Patient/family able to verbalize anxieties,  comfort level  5/7/2025 0915 by Lisset Barboza RN  Outcome: Progressing  5/6/2025 2108 by Avis Gay RN  Outcome: Progressing  Flowsheets (Taken 5/6/2025 2021)  Verbalizes/displays adequate comfort level or baseline comfort level: Assess pain using appropriate pain scale  Note: Patient able to verbalize adequate comfort levels and assistance, if needed, to control pain.      Problem: Anxiety  Goal: Will report anxiety at manageable levels  Description: INTERVENTIONS:1. Administer medication as ordered2. Teach and rehearse alternative coping skills3. Provide emotional support with 1:1 interaction with staff  5/7/2025 0915 by Lisset Barboza RN  Outcome: Not Progressing  Flowsheets (Taken 5/7/2025 0904)  Will report anxiety at manageable levels:   Administer medication as ordered   Teach and rehearse alternative coping skills   Provide emotional support with 1:1 interaction with staff  Note: Patient reports  anxiety. Pt taking Atarax prn. Verbalized medication was somewhat effective.  5/6/2025 2108 by Avis Gay RN  Outcome: Progressing  Flowsheets  Taken 5/6/2025 2030  Will report anxiety at manageable levels:   Administer medication as ordered   Teach and rehearse alternative coping skills   Provide emotional support with 1:1 interaction with staff  Taken 5/6/2025 2023  Will report anxiety at manageable levels:   Administer medication as ordered   Teach and rehearse alternative coping skills   Provide emotional support with 1:1 interaction with staff  Note: Patient continues to report anxiety, but states that it has improved since his admission.      Problem: Discharge Planning  Goal: Discharge to home or other facility with appropriate resources  5/7/2025 0915 by Lisset Barboza RN  Outcome: Progressing  Flowsheets (Taken 5/7/2025 0904)  Discharge to home or other facility with appropriate resources: Identify barriers to discharge with patient and caregiver  Note: Discharge planners working with patient to

## 2025-05-07 NOTE — PROGRESS NOTES
Daily Progress Note  Stanton Villalobos MD  5/7/2025    Reviewed patient's current plan of care and vital signs with nursing staff.  Sleep:  8 hours last night  Attending groups: Yes  No reported Suicidal thought. Good interaction with peers & staff. No agitation or anger outbursts. Mood 7 on a scale of 1 to 10 with 10 is feeling normal. He is hopeful.      SUBJECTIVE:    Patient is feeling better. SUICIDAL IDEATION denies suicidal ideation.  Patient does not have medication side effects.    ROS: Patient has new complaints:  No  Sleeping adequately:  Yes  Visitors: No    Mental Status Examination:  Patient is cooperative. Speech: Normal rate and tone.  No abnormal movements, tics or mannerisms.  Mood dysthymic; affect normal affect. Suicidal ideation Absent.  Homicidal ideations Absent.   Hallucinations Absent.  Delusions Absent. Thought Content: normal. Thought Processes: Goal-Directed. Alert and oriented X 3.  Attention and concentration fair. MEMORY intact.  Insight and Judgement fair.      Data   height is 1.829 m (6') and weight is 96.6 kg (213 lb). His tympanic temperature is 98.3 °F (36.8 °C). His blood pressure is 139/98 (abnormal) and his pulse is 92. His respiration is 18 and oxygen saturation is 97%.   Labs:            Medications  Current Facility-Administered Medications: divalproex (DEPAKOTE ER) extended release tablet 1,000 mg, 1,000 mg, Oral, Daily  sertraline (ZOLOFT) tablet 50 mg, 50 mg, Oral, Daily  traZODone (DESYREL) tablet 50 mg, 50 mg, Oral, Nightly  acetaminophen (TYLENOL) tablet 650 mg, 650 mg, Oral, Q4H PRN  ibuprofen (ADVIL;MOTRIN) tablet 400 mg, 400 mg, Oral, Q6H PRN  polyethylene glycol (GLYCOLAX) packet 17 g, 17 g, Oral, Daily PRN  hydrOXYzine HCl (ATARAX) tablet 50 mg, 50 mg, Oral, TID PRN    ASSESSMENT  MDD (major depressive disorder), recurrent episode, moderate (HCC)     PLAN  Patient's symptoms are improving  Continue with current medications.  Attempt to develop insight  Psycho-education

## 2025-05-07 NOTE — BH NOTE
Goal Wrap-Up/Relaxation Group    Date: 5/6/2025  Start Time: 2000  End Time:  2020    Type of Group: Goal Wrap Up and Relaxation    States that goal today was: \"learn how to talk to people\"    Goal for today was Met    Patient Participated in group/activities appropriately      Signature: Chio Gonzalez RN

## 2025-05-07 NOTE — PLAN OF CARE
Problem: Discharge Planning  Goal: Discharge to home or other facility with appropriate resources  5/6/2025 2108 by Avis Gay, RN  Outcome: Not Progressing  Flowsheets (Taken 5/6/2025 2027)  Discharge to home or other facility with appropriate resources: Identify barriers to discharge with patient and caregiver  Note: No plans for discharge, at this time.   5/6/2025 1326 by Jane Shin RN  Outcome: Progressing     Problem: Anxiety  Goal: Will report anxiety at manageable levels  Description: INTERVENTIONS:1. Administer medication as ordered2. Teach and rehearse alternative coping skills3. Provide emotional support with 1:1 interaction with staff  5/6/2025 2108 by Avis Gay, RN  Outcome: Progressing  Flowsheets  Taken 5/6/2025 2030  Will report anxiety at manageable levels:   Administer medication as ordered   Teach and rehearse alternative coping skills   Provide emotional support with 1:1 interaction with staff  Taken 5/6/2025 2023  Will report anxiety at manageable levels:   Administer medication as ordered   Teach and rehearse alternative coping skills   Provide emotional support with 1:1 interaction with staff  Note: Patient continues to report anxiety, but states that it has improved since his admission.   5/6/2025 1326 by Jane Shin, RN  Outcome: Progressing  Note: Verbalized feeling \"better\" after Atarax     Problem: Coping  Goal: Pt/Family able to verbalize concerns and demonstrate effective coping strategies  Description: INTERVENTIONS:1. Assist patient/family to identify coping skills, available support systems and cultural and spiritual values2. Provide emotional support, including active listening and acknowledgement of concerns of patient and caregivers3. Reduce environmental stimuli, as able4. Instruct patient/family in relaxation techniques, as appropriate5. Assess for spiritual pain/suffering and initiate Spiritual Care, Psychosocial Clinical Specialist consults as  level of functioning, self care needs and offer support as indicated2. Assess patient/family knowledge of depression, impact on illness and need for teaching3. Provide emotional support, presence and reassurance4. Assess for possible suicidal thoughts or ideation. If patient expresses suicidal thoughts or statements do not leave alone, initiate Suicide Precautions, move to a room close to the nursing station and obtain sitter5. Initiate consults as appropriate with Mental Health Professional, Spiritual Care, Psychosocial CNS, and consider a recommendation to the LIP for a Psychiatric Consultation  5/6/2025 2108 by Avis Gay, RN  Outcome: Progressing  Flowsheets  Taken 5/6/2025 2030  Effect of psychiatric condition will be minimized and patient will be protected from self harm:   Assess impact of patient’s symptoms on level of functioning, self care needs and offer support as indicated   Assess patient/family knowledge of depression, impact on illness and need for teaching   Provide emotional support, presence and reassurance  Taken 5/6/2025 2023  Effect of psychiatric condition will be minimized and patient will be protected from self harm:   Assess impact of patient’s symptoms on level of functioning, self care needs and offer support as indicated   Assess patient/family knowledge of depression, impact on illness and need for teaching   Provide emotional support, presence and reassurance  Note: Patient denies thoughts of self-harm and SI.   5/6/2025 1326 by Jane Shin RN  Outcome: Progressing     Problem: Spiritual Care  Goal: Pt/Family able to move forward in process of forgiving self, others, and/or higher power  Description: INTERVENTIONS:1. Assist patient/family to overcome blocks to healing by use of spiritual practices (prayer, meditation, guided imagery, reiki, breath work, etc).2. De-myth guilt and help patient/family identify possible irrational spiritual/cultural beliefs and values.3. Explore

## 2025-05-07 NOTE — DISCHARGE INSTR - DIET

## 2025-05-08 VITALS
RESPIRATION RATE: 18 BRPM | HEIGHT: 72 IN | TEMPERATURE: 97.5 F | SYSTOLIC BLOOD PRESSURE: 144 MMHG | WEIGHT: 213 LBS | DIASTOLIC BLOOD PRESSURE: 67 MMHG | OXYGEN SATURATION: 98 % | BODY MASS INDEX: 28.85 KG/M2 | HEART RATE: 77 BPM

## 2025-05-08 LAB — VALPROATE SERPL-MCNC: 59 UG/ML (ref 50–100)

## 2025-05-08 PROCEDURE — 80164 ASSAY DIPROPYLACETIC ACD TOT: CPT

## 2025-05-08 PROCEDURE — 36415 COLL VENOUS BLD VENIPUNCTURE: CPT

## 2025-05-08 PROCEDURE — 5130000000 HC BRIDGE APPOINTMENT

## 2025-05-08 PROCEDURE — 6370000000 HC RX 637 (ALT 250 FOR IP): Performed by: PSYCHIATRY & NEUROLOGY

## 2025-05-08 RX ORDER — DIVALPROEX SODIUM 500 MG/1
1000 TABLET, FILM COATED, EXTENDED RELEASE ORAL DAILY
Qty: 60 TABLET | Refills: 0 | Status: SHIPPED | OUTPATIENT
Start: 2025-05-09

## 2025-05-08 RX ORDER — TRAZODONE HYDROCHLORIDE 50 MG/1
50 TABLET ORAL NIGHTLY PRN
Qty: 30 TABLET | Refills: 0 | Status: SHIPPED | OUTPATIENT
Start: 2025-05-08

## 2025-05-08 RX ORDER — TRAZODONE HYDROCHLORIDE 50 MG/1
50 TABLET ORAL NIGHTLY PRN
Status: DISCONTINUED | OUTPATIENT
Start: 2025-05-08 | End: 2025-05-08 | Stop reason: HOSPADM

## 2025-05-08 RX ORDER — HYDROXYZINE HYDROCHLORIDE 50 MG/1
50 TABLET, FILM COATED ORAL 3 TIMES DAILY PRN
Qty: 90 TABLET | Refills: 0 | Status: SHIPPED | OUTPATIENT
Start: 2025-05-08 | End: 2025-06-07

## 2025-05-08 RX ADMIN — SERTRALINE 50 MG: 50 TABLET, FILM COATED ORAL at 07:43

## 2025-05-08 RX ADMIN — DIVALPROEX SODIUM 1000 MG: 500 TABLET, EXTENDED RELEASE ORAL at 07:43

## 2025-05-08 ASSESSMENT — PAIN SCALES - GENERAL: PAINLEVEL_OUTOF10: 0

## 2025-05-08 NOTE — DISCHARGE SUMMARY
Physician Discharge Summary     Patient ID:  Ben Smith  [id number]  19 y.o.  2005    Admit date: 5/4/2025    Discharge date and time: 5/8/2025  8:54 AM     Admitting Physician: Stanton Villalobos MD     Discharge Physician: Stanton Villalobos MD      Admission Diagnoses: Suicidal ideation [R45.851]  Bipolar affective disorder, current episode depressed, current episode severity unspecified (HCC) [F31.30]  Depression, unspecified depression type [F32.A]    IDENTIFYING INFORMATION: ***    HISTORY OF PRESENT ILLNESS: ***    MENTAL STATUS EXAMINATION AT ADMISSION: See H and P.    Discharge Diagnoses:   MDD (major depressive disorder), recurrent episode, moderate (HCC)     History reviewed. No pertinent past medical history.     Admission Condition: poor    Discharged Condition: stable    Indication for Admission: threat to self    Significant Diagnostic Studies:   See Results Review tab in EHR      Depakote level:   Recent Labs     05/08/25  0628   VALPROATE 59.0       TREATMENT AND CLINICAL COURSE:   Patient was admitted on the unit. Routine lab was ordered. Physical examination was within normal limits. At admission, patient was started on ***; Hydroxyzine & Trazodone were added. Patient did not have side effect from medications. Patient was involved in group and milieu therapy. Although patient was suicidal upon admission, patient did not have suicidal thought during this hospital stay. I strongly encouraged patient's sobriety from illicit drugs, cannabis and alcohol.  I spent some time discussing the effect of illicit drugs, cannabis & alcohol on patient's mood. We discussed about smoking cessation. Toward the end of the hospital stay, patient become more hopeful. Overall, hospital stay was uncomplicated, and patient was discharged in stable condition.    Consults: none    Treatments: Psychotropic medications, therapy with group, milieu, and 1:1 with nurses, social workers and Attending physician.      Discharge

## 2025-05-08 NOTE — PROGRESS NOTES
Daily Progress Note  Stanton Villalobos MD  5/8/2025    Reviewed patient's current plan of care and vital signs with nursing staff.  Sleep:  7 hours last night broken  Attending groups: Yes  No reported Suicidal thought. Good interaction with peers & staff. No agitation or anger outbursts. Mood 10 on a scale of 1 to 10 with 10 is feeling normal. He is hopeful for the future and feels ready for discharge.  I wanted to increase sertraline since he is on a small dose but he does not feel it is necessary.      SUBJECTIVE:    Patient is feeling better. SUICIDAL IDEATION denies suicidal ideation.  Patient does not have medication side effects.    ROS: Patient has new complaints:  No  Sleeping adequately:  Yes  Visitors: No    Mental Status Examination:  Patient is cooperative. Speech: Normal rate and tone.  No abnormal movements, tics or mannerisms.  Mood euthymic; affect normal affect. Suicidal ideation Absent.  Homicidal ideations Absent.   Hallucinations Absent.  Delusions Absent. Thought Content: normal. Thought Processes: Goal-Directed. Alert and oriented X 3.  Attention and concentration fair. MEMORY intact.  Insight and Judgement fair.      Data   height is 1.829 m (6') and weight is 96.6 kg (213 lb). His tympanic temperature is 97.5 °F (36.4 °C). His blood pressure is 144/67 (abnormal) and his pulse is 77. His respiration is 18 and oxygen saturation is 98%.   Labs:    Latest Reference Range & Units 05/08/25 06:28   Valproic Acid Lvl 50.0 - 100.0 ug/mL 59.0         Medications  Current Facility-Administered Medications: divalproex (DEPAKOTE ER) extended release tablet 1,000 mg, 1,000 mg, Oral, Daily  sertraline (ZOLOFT) tablet 50 mg, 50 mg, Oral, Daily  traZODone (DESYREL) tablet 50 mg, 50 mg, Oral, Nightly  acetaminophen (TYLENOL) tablet 650 mg, 650 mg, Oral, Q4H PRN  ibuprofen (ADVIL;MOTRIN) tablet 400 mg, 400 mg, Oral, Q6H PRN  polyethylene glycol (GLYCOLAX) packet 17 g, 17 g, Oral, Daily PRN  hydrOXYzine HCl (ATARAX)

## 2025-05-08 NOTE — GROUP NOTE
Group Therapy Note    Date: 5/8/2025    Group Start Time: 0900  Group End Time: 0930  Group Topic: Community Meeting    [facility]    Tom Valentine CTRS        Group Therapy Note    Attendees: 6       Patient's Goal:  Go home    Notes:  Pt is an active participant in group conversation with verbal cues and prompting from writer. Without verbal prompt, patient is an active listener.       Status After Intervention:  Improved    Participation Level: Active Listener, Interactive, and Minimal    Participation Quality: Appropriate, Attentive, Sharing, and Supportive      Speech:  normal      Thought Process/Content: Logical  Linear      Affective Functioning: Congruent      Mood: euthymic      Level of consciousness:  Alert, Oriented x4, and Attentive      Response to Learning: Able to verbalize current knowledge/experience, Able to verbalize/acknowledge new learning, Able to retain information, Capable of insight, Able to change behavior, and Progressing to goal      Endings: None Reported    Modes of Intervention: Education, Support, Socialization, Exploration, Clarifying, Problem-solving, and Activity      Discipline Responsible: Recreational Therapist      Signature:  OLIVE Leary

## 2025-05-08 NOTE — PROGRESS NOTES
Behavioral Health   Discharge Note    Pt discharged with followings belongings:   Dental Appliances: None  Vision - Corrective Lenses: None  Hearing Aid: None  Jewelry: None  Body Piercings Removed: N/A  Clothing: Footwear, Pants, Shirt, Socks  Other Valuables: Other (Comment) (n/a)   Valuables retrieved from safe, security envelope number:  N/A and returned to patient.  Patient left department with staff/family via ambulatory.  Discharged to private residence. \"An Important Message from Medicare About Your Rights\" (IMM) form photocopy original from admission and provided to pt at least 4 hours prior to discharge N/A. \"An Important Message from [employer] About Your Rights\" (IMM) form photocopy original from admission. N/A. If pt left within 4 hours of receiving 2nd delivery of IMM, this is because pt was agreeable with hospital discharge.  Patient/guardian education on aftercare instructions: Yes  Bridge appointment completed:  yes.  Reviewed Discharge Instructions with patient/family/nursing facility.  Patient/family verbalizes understanding and agreement with the discharge plan using the teachback method.   Patient/family verbalize understanding of AVS:Yes    Status EXAM upon discharge:  Mental Status and Behavioral Exam  Normal: No  Level of Assistance: Independent/Self  Facial Expression: Brightened  Affect: Blunt  Level of Consciousness: Alert  Frequency of Checks: 4 times per hour, close  Mood:Normal: Yes  Mood: Anxious  Motor Activity:Normal: Yes  Eye Contact: Good  Observed Behavior: Cooperative, Friendly  Sexual Misconduct History: Current - no  Preception: [place] to person, [place] to time, [place] to place, [place] to situation  Attention:Normal: Yes  Thought Processes: Unremarkable  Thought Content:Normal: Yes  Thought Content: Delusions  Depression Symptoms: No problems reported or observed.  Anxiety Symptoms: No problems reported or observed.  Marsha Symptoms: No problems reported or

## 2025-05-08 NOTE — PLAN OF CARE
Problem: Anxiety  Goal: Will report anxiety at manageable levels  Description: INTERVENTIONS:1. Administer medication as ordered2. Teach and rehearse alternative coping skills3. Provide emotional support with 1:1 interaction with staff  5/7/2025 2056 by Palomo Smith RN  Outcome: Progressing  Note: Pt denies feeling anxious this evening.  5/7/2025 0915 by Lisset Barboza RN  Outcome: Not Progressing  Flowsheets (Taken 5/7/2025 0904)  Will report anxiety at manageable levels:   Administer medication as ordered   Teach and rehearse alternative coping skills   Provide emotional support with 1:1 interaction with staff  Note: Patient reports  anxiety. Pt taking Atarax prn. Verbalized medication was somewhat effective.     Problem: Coping  Goal: Pt/Family able to verbalize concerns and demonstrate effective coping strategies  Description: INTERVENTIONS:1. Assist patient/family to identify coping skills, available support systems and cultural and spiritual values2. Provide emotional support, including active listening and acknowledgement of concerns of patient and caregivers3. Reduce environmental stimuli, as able4. Instruct patient/family in relaxation techniques, as appropriate5. Assess for spiritual pain/suffering and initiate Spiritual Care, Psychosocial Clinical Specialist consults as needed  5/7/2025 2056 by Palomo Smith RN  Outcome: Progressing  Flowsheets (Taken 5/7/2025 2056)  Patient/family able to verbalize anxieties, fears, and concerns, and demonstrate effective coping: Provide emotional support, including active listening and acknowledgement of concerns of patient and caregivers  5/7/2025 0915 by Lisset Barboza RN  Outcome: Progressing  Flowsheets (Taken 5/6/2025 2030 by Avis Gay RN)  Patient/family able to verbalize anxieties, fears, and concerns, and demonstrate effective coping:   Assist patient/family to identify coping skills, available support systems and cultural and spiritual

## 2025-05-08 NOTE — TRANSITION OF CARE
Behavioral Health Transition Record    Patient Name: Ben Smith  YOB: 2005   Medical Record Number: [id number]  Date of Admission: 5/4/2025  7:48 PM   Date of Discharge: 5/8/25    Attending Provider: Stanton Villalobos MD   Discharging Provider: Wilfredo  To contact this individual call  x2 and ask the  to page.  If unavailable, ask to be transferred to Behavioral Health Provider on call.  A Behavioral Health Provider will be available on call 24/7 and during holidays.    Primary Care Provider: Avery Holman DO    Allergies   Allergen Reactions    Penicillins Palpitations    Prozac [Fluoxetine] Anxiety       Reason for Admission: Bipolar Disorder    Admission Diagnosis: Suicidal ideation [R45.851]  Bipolar affective disorder, current episode depressed, current episode severity unspecified (HCC) [F31.30]  Depression, unspecified depression type [F32.A]    * No surgery found *    Results for orders placed or performed during the hospital encounter of 05/04/25   CBC with Auto Differential   Result Value Ref Range    WBC 6.3 4.8 - 10.8 thou/mm3    RBC 4.98 4.70 - 6.10 mill/mm3    Hemoglobin 14.6 14.0 - 18.0 gm/dl    Hematocrit 42.3 42.0 - 52.0 %    MCV 84.9 80.0 - 94.0 fL    MCH 29.3 26.0 - 33.0 pg    MCHC 34.5 32.2 - 35.5 gm/dl    RDW-CV 12.9 11.5 - 14.5 %    RDW-SD 39.5 35.0 - 45.0 fL    Platelets 195 130 - 400 thou/mm3    MPV 11.8 9.4 - 12.4 fL    Seg Neutrophils 59.6 %    Lymphocytes 28.0 %    Monocytes % 9.7 %    Eosinophils 1.7 %    Basophils 0.8 %    Immature Granulocytes % 0.2 %    Neutrophils Absolute 3.8 1.8 - 7.7 thou/mm3    Lymphocytes Absolute 1.8 1.0 - 4.8 thou/mm3    Monocytes Absolute 0.6 0.4 - 1.3 thou/mm3    Eosinophils Absolute 0.1 0.0 - 0.4 thou/mm3    Basophils Absolute 0.1 0.0 - 0.1 thou/mm3    Immature Grans (Abs) 0.01 0.00 - 0.07 thou/mm3    nRBC 0 /100 wbc   Basic Metabolic Panel   Result Value Ref Range    Sodium 144 135 - 145 meq/L    Potassium 3.6 3.5 -

## 2025-05-14 ENCOUNTER — TELEPHONE (OUTPATIENT)
Age: 20
End: 2025-05-14

## 2025-05-14 NOTE — TELEPHONE ENCOUNTER
Follow up call to patient to evaluate if they had questions related to the recent stay or discharge.  Patient's emergency contact noted no questions at this time and stated that patient is \"doing better than he has in a long time.\".